# Patient Record
Sex: FEMALE | URBAN - METROPOLITAN AREA
[De-identification: names, ages, dates, MRNs, and addresses within clinical notes are randomized per-mention and may not be internally consistent; named-entity substitution may affect disease eponyms.]

---

## 2023-04-14 ENCOUNTER — APPOINTMENT (OUTPATIENT)
Dept: URBAN - METROPOLITAN AREA CLINIC 272 | Age: 58
Setting detail: DERMATOLOGY
End: 2023-04-14

## 2023-04-14 DIAGNOSIS — Z41.9 ENCOUNTER FOR PROCEDURE FOR PURPOSES OTHER THAN REMEDYING HEALTH STATE, UNSPECIFIED: ICD-10-CM

## 2023-04-14 PROCEDURE — OTHER FACIAL: OTHER

## 2023-04-14 ASSESSMENT — LOCATION ZONE DERM: LOCATION ZONE: FACE

## 2023-04-14 ASSESSMENT — LOCATION DETAILED DESCRIPTION DERM: LOCATION DETAILED: RIGHT INFERIOR MEDIAL FOREHEAD

## 2023-04-14 ASSESSMENT — LOCATION SIMPLE DESCRIPTION DERM: LOCATION SIMPLE: RIGHT FOREHEAD

## 2023-04-14 NOTE — HPI: COSMETIC (FACIAL)
Have You Had A Facial Before?: has not had a previous facial
When Outside In The Sun, Do You...: rarely burns, mostly tans
Additional History: Cleanse with gentle wash. Remove with lavender hot towel. Exfo polish. Exfo polish activator. Remove with lavender hot towel. Warm 4x4. Massage oil. Dermaplane. Cool 4x4. Stimulator peel prep. Stimulator peel. 5 minutes. Neutralizer. Cleanse with gentle wash. Remove with lavender hot towel. Brightening sheet mask. Ice globes. 5 minutes. Daily power. Growth factor. Smart tone.

## 2023-04-14 NOTE — PROCEDURE: FACIAL
Exfoliation Type: dermaplane
Detail Level: Zone
Price (Use Numbers Only, No Special Characters Or $): 125
Mask Type (Optional): collagen